# Patient Record
Sex: FEMALE | Race: BLACK OR AFRICAN AMERICAN | NOT HISPANIC OR LATINO | Employment: UNEMPLOYED | URBAN - METROPOLITAN AREA
[De-identification: names, ages, dates, MRNs, and addresses within clinical notes are randomized per-mention and may not be internally consistent; named-entity substitution may affect disease eponyms.]

---

## 2024-11-27 ENCOUNTER — APPOINTMENT (OUTPATIENT)
Dept: LAB | Facility: MEDICAL CENTER | Age: 34
End: 2024-11-27
Attending: FAMILY MEDICINE
Payer: COMMERCIAL

## 2024-11-27 ENCOUNTER — OFFICE VISIT (OUTPATIENT)
Dept: MEDICAL GROUP | Facility: PHYSICIAN GROUP | Age: 34
End: 2024-11-27
Payer: COMMERCIAL

## 2024-11-27 VITALS
DIASTOLIC BLOOD PRESSURE: 60 MMHG | HEIGHT: 69 IN | BODY MASS INDEX: 20.44 KG/M2 | TEMPERATURE: 98.1 F | OXYGEN SATURATION: 97 % | WEIGHT: 138 LBS | SYSTOLIC BLOOD PRESSURE: 112 MMHG | HEART RATE: 98 BPM

## 2024-11-27 DIAGNOSIS — G89.29 CHRONIC RUQ PAIN: ICD-10-CM

## 2024-11-27 DIAGNOSIS — Z23 NEED FOR VACCINATION: ICD-10-CM

## 2024-11-27 DIAGNOSIS — R10.11 CHRONIC RUQ PAIN: ICD-10-CM

## 2024-11-27 DIAGNOSIS — D25.9 UTERINE LEIOMYOMA, UNSPECIFIED LOCATION: ICD-10-CM

## 2024-11-27 DIAGNOSIS — Z00.00 WELLNESS EXAMINATION: ICD-10-CM

## 2024-11-27 DIAGNOSIS — Z22.7 TB LUNG, LATENT: ICD-10-CM

## 2024-11-27 RX ORDER — ISONIAZID 300 MG/1
TABLET ORAL
Qty: 36 TABLET | Refills: 0 | Status: SHIPPED | OUTPATIENT
Start: 2024-11-27

## 2024-11-27 ASSESSMENT — PATIENT HEALTH QUESTIONNAIRE - PHQ9: CLINICAL INTERPRETATION OF PHQ2 SCORE: 0

## 2024-11-27 NOTE — PROGRESS NOTES
"Verbal consent was acquired by the patient to use [employer] ambient listening note generation during this visit     Subjective:     HPI:   History of Present Illness  The patient presents for evaluation of multiple medical concerns and to establish care. She is accompanied by an adult male.    Abdominal Pain  She reports experiencing sharp pain in her right upper abdomen, particularly when standing or applying pressure to the area. This discomfort has been present for approximately 5 months. She has a history of two C-sections, during which a fibroid was discovered. The fibroid has since reduced in size. She first noticed the sharp pain during a checkup in [place]. She is considering having a third child and is concerned about the possibility of another . She reports no nausea, vomiting, breathing difficulties, back pain, or rashes. An x-ray conducted in [place] showed no abnormalities.  - Onset: First noticed during a checkup in [place].  - Location: Abdomen.  - Duration: Approximately 5 months.  - Character: Sharp pain, particularly when standing or applying pressure.  - Severity: Concerned about the possibility of another .      Latent TB  She has a past medical history of latent tuberculosis identified on her immigration testing. During her immigration process, she was informed that she had latent tuberculosis and would require treatment. She reports no current symptoms such as cough or weight loss. She has not undergone any kidney or liver tests. She has not received her influenza vaccine this season.        Objective:     Exam:  /60 (BP Location: Left arm, Patient Position: Sitting, BP Cuff Size: Adult)   Pulse 98   Temp 36.7 °C (98.1 °F) (Temporal)   Ht 1.753 m (5' 9\")   Wt 62.6 kg (138 lb)   SpO2 97%   BMI 20.38 kg/m²  Body mass index is 20.38 kg/m².    Physical Exam  Constitutional:       Appearance: Normal appearance.   HENT:      Head: Normocephalic and atraumatic.      " Right Ear: Tympanic membrane, ear canal and external ear normal.      Left Ear: Tympanic membrane, ear canal and external ear normal.      Nose: Nose normal.      Mouth/Throat:      Mouth: Mucous membranes are moist.   Eyes:      Extraocular Movements: Extraocular movements intact.      Conjunctiva/sclera: Conjunctivae normal.      Pupils: Pupils are equal, round, and reactive to light.   Cardiovascular:      Rate and Rhythm: Normal rate and regular rhythm.      Heart sounds: No murmur heard.  Pulmonary:      Effort: Pulmonary effort is normal.      Breath sounds: Normal breath sounds.   Abdominal:      General: Abdomen is flat. Bowel sounds are normal. There is no distension.      Palpations: There is no mass.      Tenderness: There is no abdominal tenderness. There is no guarding or rebound.   Musculoskeletal:      Cervical back: Neck supple.   Skin:     General: Skin is warm and dry.   Neurological:      General: No focal deficit present.      Mental Status: She is alert and oriented to person, place, and time.   Psychiatric:         Mood and Affect: Mood normal.         Assessment & Plan:     1. TB lung, latent  Comp Metabolic Panel    Referral to Infectious Disease    isoniazid (NYDRAZID) 300 MG Tab    Rifapentine 150 MG Tab      2. Wellness examination  INSULIN FASTING    HEMOGLOBIN A1C    TSH+FREE T4    Lipid Profile    Comp Metabolic Panel    CBC WITH DIFFERENTIAL    INFLUENZA VACCINE TRI INJ (PF)      3. Need for vaccination  INFLUENZA VACCINE TRI INJ (PF)      4. Uterine leiomyoma, unspecified location  Referral to OB/Gyn    CT-ABDOMEN-PELVIS WITH      5. Chronic RUQ pain  CT-ABDOMEN-PELVIS WITH          Assessment & Plan  1. Abdominal pain  - Pain appears to be muscular in nature, possibly due to muscle spasms or irritation  - Pain is sharp and occurs when touched  - No associated nausea, vomiting, breathing difficulties, back pain, or rashes  - CT scan of the abdomen will be ordered to further  investigate    2. History of C-sections/Uterine fibroid  - Discussed potential risks associated with a third , such as scarring and rupture  - Referral to an OB/GYN to ensure it is safe to carry a third child and manage any complications    3. Latent tuberculosis  - Reviewed email sent by the immigration department    --Patient will forward the information to have in her chart  - Treatment for latent tuberculosis will be initiated  - Referral to an infectious disease specialist for comprehensive care  - Advised to upload previous lab work and chest x-ray results to [employer]  - Additional lab work will be ordered to monitor health before starting treatment    4. Health Maintenance  - Influenza vaccine recommended to prevent severe illness during flu season  - Will order routine lab work as well          Return in about 6 weeks (around 2025) for Med F/U, Lab F/U.    Please note that this dictation was created using voice recognition software. I have made every reasonable attempt to correct obvious errors, but I expect that there are errors of grammar and possibly content that I did not discover before finalizing the note.        I personally reviewed and updated and reviewed the patient's personal, social, family and surgical history at today's appointment.

## 2024-12-05 ENCOUNTER — TELEPHONE (OUTPATIENT)
Dept: MEDICAL GROUP | Facility: PHYSICIAN GROUP | Age: 34
End: 2024-12-05
Payer: COMMERCIAL

## 2024-12-05 NOTE — TELEPHONE ENCOUNTER
Can we call the patient and have her bring in her TB test results and/or xray for the ID referral?

## 2024-12-13 DIAGNOSIS — Z22.7 TB LUNG, LATENT: ICD-10-CM

## 2024-12-23 ENCOUNTER — APPOINTMENT (OUTPATIENT)
Dept: RADIOLOGY | Facility: MEDICAL CENTER | Age: 34
End: 2024-12-23
Attending: FAMILY MEDICINE
Payer: COMMERCIAL

## 2024-12-26 ENCOUNTER — HOSPITAL ENCOUNTER (OUTPATIENT)
Dept: RADIOLOGY | Facility: MEDICAL CENTER | Age: 34
End: 2024-12-26
Attending: FAMILY MEDICINE
Payer: COMMERCIAL

## 2024-12-26 DIAGNOSIS — R10.11 CHRONIC RUQ PAIN: ICD-10-CM

## 2024-12-26 DIAGNOSIS — Z22.7 TB LUNG, LATENT: ICD-10-CM

## 2024-12-26 DIAGNOSIS — G89.29 CHRONIC RUQ PAIN: ICD-10-CM

## 2024-12-26 DIAGNOSIS — D25.9 UTERINE LEIOMYOMA, UNSPECIFIED LOCATION: ICD-10-CM

## 2024-12-26 PROCEDURE — 74177 CT ABD & PELVIS W/CONTRAST: CPT

## 2024-12-26 PROCEDURE — 700117 HCHG RX CONTRAST REV CODE 255: Performed by: FAMILY MEDICINE

## 2024-12-26 PROCEDURE — 71046 X-RAY EXAM CHEST 2 VIEWS: CPT

## 2024-12-26 RX ADMIN — IOHEXOL 25 ML: 240 INJECTION, SOLUTION INTRATHECAL; INTRAVASCULAR; INTRAVENOUS; ORAL at 15:00

## 2024-12-26 RX ADMIN — IOHEXOL 100 ML: 350 INJECTION, SOLUTION INTRAVENOUS at 16:35

## 2024-12-30 ENCOUNTER — HOSPITAL ENCOUNTER (OUTPATIENT)
Dept: LAB | Facility: MEDICAL CENTER | Age: 34
End: 2024-12-30
Attending: FAMILY MEDICINE
Payer: COMMERCIAL

## 2024-12-30 DIAGNOSIS — Z00.00 WELLNESS EXAMINATION: ICD-10-CM

## 2024-12-30 DIAGNOSIS — Z22.7 TB LUNG, LATENT: ICD-10-CM

## 2024-12-30 LAB
ALBUMIN SERPL BCP-MCNC: 4.3 G/DL (ref 3.2–4.9)
ALBUMIN/GLOB SERPL: 1.2 G/DL
ALP SERPL-CCNC: 54 U/L (ref 30–99)
ALT SERPL-CCNC: 15 U/L (ref 2–50)
ANION GAP SERPL CALC-SCNC: 15 MMOL/L (ref 7–16)
AST SERPL-CCNC: 18 U/L (ref 12–45)
BASOPHILS # BLD AUTO: 0.6 % (ref 0–1.8)
BASOPHILS # BLD: 0.04 K/UL (ref 0–0.12)
BILIRUB SERPL-MCNC: 0.3 MG/DL (ref 0.1–1.5)
BUN SERPL-MCNC: 9 MG/DL (ref 8–22)
CALCIUM ALBUM COR SERPL-MCNC: 8.8 MG/DL (ref 8.5–10.5)
CALCIUM SERPL-MCNC: 9 MG/DL (ref 8.5–10.5)
CHLORIDE SERPL-SCNC: 103 MMOL/L (ref 96–112)
CHOLEST SERPL-MCNC: 161 MG/DL (ref 100–199)
CO2 SERPL-SCNC: 21 MMOL/L (ref 20–33)
CREAT SERPL-MCNC: 0.49 MG/DL (ref 0.5–1.4)
EOSINOPHIL # BLD AUTO: 0.1 K/UL (ref 0–0.51)
EOSINOPHIL NFR BLD: 1.5 % (ref 0–6.9)
ERYTHROCYTE [DISTWIDTH] IN BLOOD BY AUTOMATED COUNT: 41.7 FL (ref 35.9–50)
EST. AVERAGE GLUCOSE BLD GHB EST-MCNC: 103 MG/DL
GFR SERPLBLD CREATININE-BSD FMLA CKD-EPI: 126 ML/MIN/1.73 M 2
GLOBULIN SER CALC-MCNC: 3.6 G/DL (ref 1.9–3.5)
GLUCOSE SERPL-MCNC: 87 MG/DL (ref 65–99)
HBA1C MFR BLD: 5.2 % (ref 4–5.6)
HCT VFR BLD AUTO: 40.2 % (ref 37–47)
HDLC SERPL-MCNC: 53 MG/DL
HGB BLD-MCNC: 12.7 G/DL (ref 12–16)
IMM GRANULOCYTES # BLD AUTO: 0.01 K/UL (ref 0–0.11)
IMM GRANULOCYTES NFR BLD AUTO: 0.1 % (ref 0–0.9)
LDLC SERPL CALC-MCNC: 93 MG/DL
LYMPHOCYTES # BLD AUTO: 2.82 K/UL (ref 1–4.8)
LYMPHOCYTES NFR BLD: 41 % (ref 22–41)
MCH RBC QN AUTO: 27.5 PG (ref 27–33)
MCHC RBC AUTO-ENTMCNC: 31.6 G/DL (ref 32.2–35.5)
MCV RBC AUTO: 87.2 FL (ref 81.4–97.8)
MONOCYTES # BLD AUTO: 0.5 K/UL (ref 0–0.85)
MONOCYTES NFR BLD AUTO: 7.3 % (ref 0–13.4)
NEUTROPHILS # BLD AUTO: 3.4 K/UL (ref 1.82–7.42)
NEUTROPHILS NFR BLD: 49.5 % (ref 44–72)
NRBC # BLD AUTO: 0 K/UL
NRBC BLD-RTO: 0 /100 WBC (ref 0–0.2)
PLATELET # BLD AUTO: 274 K/UL (ref 164–446)
PMV BLD AUTO: 10.8 FL (ref 9–12.9)
POTASSIUM SERPL-SCNC: 3.9 MMOL/L (ref 3.6–5.5)
PROT SERPL-MCNC: 7.9 G/DL (ref 6–8.2)
RBC # BLD AUTO: 4.61 M/UL (ref 4.2–5.4)
SODIUM SERPL-SCNC: 139 MMOL/L (ref 135–145)
T4 FREE SERPL-MCNC: 1.09 NG/DL (ref 0.93–1.7)
TRIGL SERPL-MCNC: 73 MG/DL (ref 0–149)
TSH SERPL-ACNC: 0.93 UIU/ML (ref 0.35–5.5)
WBC # BLD AUTO: 6.9 K/UL (ref 4.8–10.8)

## 2024-12-30 PROCEDURE — 80053 COMPREHEN METABOLIC PANEL: CPT

## 2024-12-30 PROCEDURE — 83525 ASSAY OF INSULIN: CPT

## 2024-12-30 PROCEDURE — 84443 ASSAY THYROID STIM HORMONE: CPT

## 2024-12-30 PROCEDURE — 36415 COLL VENOUS BLD VENIPUNCTURE: CPT

## 2024-12-30 PROCEDURE — 84439 ASSAY OF FREE THYROXINE: CPT

## 2024-12-30 PROCEDURE — 83036 HEMOGLOBIN GLYCOSYLATED A1C: CPT

## 2024-12-30 PROCEDURE — 85025 COMPLETE CBC W/AUTO DIFF WBC: CPT

## 2024-12-30 PROCEDURE — 80061 LIPID PANEL: CPT

## 2025-01-01 LAB — INSULIN P FAST SERPL-ACNC: 14 UIU/ML (ref 3–25)

## 2025-01-15 ENCOUNTER — HOSPITAL ENCOUNTER (OUTPATIENT)
Dept: LAB | Facility: MEDICAL CENTER | Age: 35
End: 2025-01-15
Attending: FAMILY MEDICINE
Payer: COMMERCIAL

## 2025-01-15 DIAGNOSIS — Z22.7 TB LUNG, LATENT: ICD-10-CM

## 2025-01-15 LAB
T4 FREE SERPL-MCNC: 1.24 NG/DL (ref 0.93–1.7)
TSH SERPL-ACNC: 1.4 UIU/ML (ref 0.35–5.5)

## 2025-01-15 PROCEDURE — 36415 COLL VENOUS BLD VENIPUNCTURE: CPT

## 2025-01-15 PROCEDURE — 84443 ASSAY THYROID STIM HORMONE: CPT

## 2025-01-15 PROCEDURE — 84439 ASSAY OF FREE THYROXINE: CPT

## 2025-01-15 PROCEDURE — 86480 TB TEST CELL IMMUN MEASURE: CPT

## 2025-01-20 ENCOUNTER — OFFICE VISIT (OUTPATIENT)
Dept: MEDICAL GROUP | Facility: PHYSICIAN GROUP | Age: 35
End: 2025-01-20
Payer: COMMERCIAL

## 2025-01-20 VITALS
HEIGHT: 69 IN | WEIGHT: 142 LBS | SYSTOLIC BLOOD PRESSURE: 118 MMHG | BODY MASS INDEX: 21.03 KG/M2 | HEART RATE: 92 BPM | TEMPERATURE: 98.2 F | DIASTOLIC BLOOD PRESSURE: 60 MMHG | OXYGEN SATURATION: 98 %

## 2025-01-20 DIAGNOSIS — K43.9 VENTRAL HERNIA WITHOUT OBSTRUCTION OR GANGRENE: ICD-10-CM

## 2025-01-20 PROCEDURE — 99214 OFFICE O/P EST MOD 30 MIN: CPT | Performed by: FAMILY MEDICINE

## 2025-01-20 PROCEDURE — 3074F SYST BP LT 130 MM HG: CPT | Performed by: FAMILY MEDICINE

## 2025-01-20 PROCEDURE — 3078F DIAST BP <80 MM HG: CPT | Performed by: FAMILY MEDICINE

## 2025-01-20 ASSESSMENT — FIBROSIS 4 INDEX: FIB4 SCORE: 0.59

## 2025-01-20 ASSESSMENT — PATIENT HEALTH QUESTIONNAIRE - PHQ9: CLINICAL INTERPRETATION OF PHQ2 SCORE: 0

## 2025-01-20 NOTE — PROGRESS NOTES
"Verbal consent was acquired by the patient to use [employer] ambient listening note generation during this visit     Subjective:     HPI:   History of Present Illness  The patient presents for evaluation of fibroids, hernia, and latent tuberculosis.    Fibroids  She has been diagnosed with uterine fibroids and is seeking information regarding their size and potential impact on her health. She is also interested in understanding the implications of these conditions on her ability to conceive naturally or through in vitro fertilization (IVF). Additionally, she is curious about the necessity of wearing a belt during exercise due to her fibroids and hernia. She reports no current bleeding issues.  - Diagnosis: Fibroids.  - Concerns: Size, impact on health, implications for natural conception or IVF, necessity of wearing a belt during exercise.  - Symptoms: No current bleeding issues.    Hernia  She has a hernia and is experiencing abdominal bulging, which she attributes to the hernia. She is concerned about the possibility of the hernia worsening with future pregnancies. She is also interested in understanding the potential long-term consequences if the hernia is left untreated.  - Diagnosis: Hernia.  - Symptoms: Abdominal bulging.  - Concerns: Worsening with future pregnancies, potential long-term consequences if untreated.    Latent Tuberculosis  She has latent tuberculosis. Patient notes that she has done her lab work.        Objective:     Exam:  /60 (BP Location: Left arm, Patient Position: Sitting, BP Cuff Size: Adult)   Pulse 92   Temp 36.8 °C (98.2 °F) (Temporal)   Ht 1.753 m (5' 9\")   Wt 64.4 kg (142 lb)   SpO2 98%   BMI 20.97 kg/m²  Body mass index is 20.97 kg/m².    Physical Exam  Constitutional:       Appearance: Normal appearance.   HENT:      Head: Normocephalic and atraumatic.      Nose: Nose normal.      Mouth/Throat:      Mouth: Mucous membranes are moist.   Eyes:      Extraocular " Movements: Extraocular movements intact.      Conjunctiva/sclera: Conjunctivae normal.      Pupils: Pupils are equal, round, and reactive to light.   Cardiovascular:      Rate and Rhythm: Normal rate and regular rhythm.      Heart sounds: No murmur heard.  Pulmonary:      Effort: Pulmonary effort is normal.      Breath sounds: Normal breath sounds.   Abdominal:      General: Bowel sounds are normal.      Palpations: Abdomen is soft.   Musculoskeletal:      Cervical back: Neck supple.   Skin:     General: Skin is warm and dry.   Neurological:      General: No focal deficit present.      Mental Status: She is alert and oriented to person, place, and time.   Psychiatric:         Mood and Affect: Mood normal.             Results  Lab Results   Component Value Date/Time    WBC 6.9 12/30/2024 09:07 AM    RBC 4.61 12/30/2024 09:07 AM    HEMOGLOBIN 12.7 12/30/2024 09:07 AM    HEMATOCRIT 40.2 12/30/2024 09:07 AM    MCV 87.2 12/30/2024 09:07 AM    MCH 27.5 12/30/2024 09:07 AM    MCHC 31.6 (L) 12/30/2024 09:07 AM    MPV 10.8 12/30/2024 09:07 AM    NEUTSPOLYS 49.50 12/30/2024 09:07 AM    LYMPHOCYTES 41.00 12/30/2024 09:07 AM    MONOCYTES 7.30 12/30/2024 09:07 AM    EOSINOPHILS 1.50 12/30/2024 09:07 AM    BASOPHILS 0.60 12/30/2024 09:07 AM       Lab Results   Component Value Date/Time    CHOLSTRLTOT 161 12/30/2024 09:07 AM    LDL 93 12/30/2024 09:07 AM    HDL 53 12/30/2024 09:07 AM    TRIGLYCERIDE 73 12/30/2024 09:07 AM       Lab Results   Component Value Date/Time    SODIUM 139 12/30/2024 09:07 AM    POTASSIUM 3.9 12/30/2024 09:07 AM    CHLORIDE 103 12/30/2024 09:07 AM    CO2 21 12/30/2024 09:07 AM    GLUCOSE 87 12/30/2024 09:07 AM    BUN 9 12/30/2024 09:07 AM    CREATININE 0.49 (L) 12/30/2024 09:07 AM     Lab Results   Component Value Date/Time    ALKPHOSPHAT 54 12/30/2024 09:07 AM    ASTSGOT 18 12/30/2024 09:07 AM    ALTSGPT 15 12/30/2024 09:07 AM    TBILIRUBIN 0.3 12/30/2024 09:07 AM       Imaging  Chest x-ray showed no  abnormalities. CT scan revealed a large ventral hernia and fibroids measuring 10.5 x 5.5 x 5.5 cm.    Assessment & Plan:     1. Ventral hernia without obstruction or gangrene  Referral to General Surgery          Assessment & Plan  1. Fibroids:  - Significant fibroid measuring 10.5 x 5.5 x 5.5 cm  - Benign but can cause issues such as worsening menstrual bleeding and complications with future pregnancies  - No immediate emergency concern with the fibroid size based on CT report  - Informed that fibroids could potentially grow back after removal  - Advised to continue exercising and consider using a belt during workouts to manage abdominal pressure  - Recommended strengthening core muscles to help manage the condition  - Referral to an OB-GYN for further evaluation and management    2. Hernia:  - Hernia with a wide neck, less likely to cause complications but can be annoying due to its size  - Responsible for the bulge in her abdomen  - Advised that surgery is the only definitive solution but generally recommended to leave it alone unless it causes significant pain or complications  - Informed about potential risks and benefits of hernia surgery, including the use of mesh and possible postoperative discomfort  - Advised to continue exercising and consider using a belt during workouts to manage abdominal pressure  - Recommended strengthening core muscles to help manage the condition  - Referral to a general surgeon for further evaluation and management    3. Latent Tuberculosis:  - History of latent TB  - Will continue current antibiotic regimen  - Referral to an infectious disease specialist once TB test results are available for further management    Follow-up:  - Patient to follow up in 1 year          Return in about 1 year (around 1/20/2026) for Annual.    Please note that this dictation was created using voice recognition software. I have made every reasonable attempt to correct obvious errors, but I expect that  there are errors of grammar and possibly content that I did not discover before finalizing the note.        I personally reviewed and updated and reviewed the patient's personal, social, family and surgical history at today's appointment.

## 2025-01-21 LAB
GAMMA INTERFERON BACKGROUND BLD IA-ACNC: 2.75 IU/ML
M TB IFN-G BLD-IMP: POSITIVE
M TB IFN-G CD4+ BCKGRND COR BLD-ACNC: >10 IU/ML
MITOGEN IGNF BCKGRD COR BLD-ACNC: >10 IU/ML
QFT TB2 - NIL TBQ2: >10 IU/ML

## 2025-01-22 DIAGNOSIS — Z22.7 TB LUNG, LATENT: ICD-10-CM

## 2025-02-24 ENCOUNTER — TELEPHONE (OUTPATIENT)
Dept: INFECTIOUS DISEASES | Facility: MEDICAL CENTER | Age: 35
End: 2025-02-24
Payer: COMMERCIAL

## 2025-03-11 NOTE — PROGRESS NOTES
Minimally Invasive Gynecologic Surgery Consult       CC: fibroids      HPI  Luke Moon is a 34 y.o. female No obstetric history on file. presenting today for the above.    Patient with fibroid uterus and the following symptoms:  - Heavy menstrual bleeding: ***  - Dysmenorrhea: ***  - Bulk symptoms: ***  - Dyspareunia: ***  - Bladder symptoms: ***  - Bowel symptoms: ***  - Infertility: ***    Previous treatment:  - Medical: ***  - Surgical: ***    Desires future fertility: ***        Imaging  CT A/P with fibroid uteurs    Menstrual History  No LMP recorded.  Regular, monthly, normal flow ***    Gynecologic History  Last pap: *** NILM/HPV neg  Hx abnormal pap smears: ***  Hx of PID/STDs: ***  Contraception plan: ***      OB History   No obstetric history on file.       Past Medical History  Past Medical History:   Diagnosis Date    Bartholin cyst        Past Surgical History  Past Surgical History:   Procedure Laterality Date    CYST EXCISION Left     PRIMARY C SECTION N/A     x2       Social History  Social History     Tobacco Use    Smoking status: Never    Smokeless tobacco: Never   Vaping Use    Vaping status: Never Used   Substance Use Topics    Alcohol use: Not Currently    Drug use: Not Currently        Family History  Family History   Problem Relation Age of Onset    Other Problem (IVP) Mother     Other Problem (Other Problem) Father        Home Medications  Current Outpatient Medications   Medication Sig    isoniazid (NYDRAZID) 300 MG Tab Take 3 caps every 7 days for 84 days    Rifapentine 150 MG Tab Take 6 Tablets by mouth every 7 days for 84 days.       Allergies/Reactions  No Known Allergies       ROS: I have reviewed all systems with patient. Pertinent positive and negative findings are listed below except for what is otherwise stated in the History of Present Illness.       Objective:    Labs  Lab Results   Component Value Date/Time    WBC 6.9 12/30/2024 09:07 AM    RBC 4.61 12/30/2024 09:07 AM     HEMOGLOBIN 12.7 12/30/2024 09:07 AM    HEMATOCRIT 40.2 12/30/2024 09:07 AM    MCV 87.2 12/30/2024 09:07 AM    MCH 27.5 12/30/2024 09:07 AM    MCHC 31.6 (L) 12/30/2024 09:07 AM    RDW 41.7 12/30/2024 09:07 AM    PLATELETCT 274 12/30/2024 09:07 AM    MPV 10.8 12/30/2024 09:07 AM    NEUTSPOLYS 49.50 12/30/2024 09:07 AM    LYMPHOCYTES 41.00 12/30/2024 09:07 AM    MONOCYTES 7.30 12/30/2024 09:07 AM    EOSINOPHILS 1.50 12/30/2024 09:07 AM    BASOPHILS 0.60 12/30/2024 09:07 AM    IMMGRAN 0.10 12/30/2024 09:07 AM    NRBC 0.00 12/30/2024 09:07 AM    NEUTS 3.40 12/30/2024 09:07 AM    LYMPHS 2.82 12/30/2024 09:07 AM    MONOS 0.50 12/30/2024 09:07 AM    EOS 0.10 12/30/2024 09:07 AM    BASO 0.04 12/30/2024 09:07 AM    IMMGRANAB 0.01 12/30/2024 09:07 AM    NRBCAB 0.00 12/30/2024 09:07 AM       Lab Results   Component Value Date/Time    HBA1C 5.2 12/30/2024 09:07 AM         Physical Exam  There were no vitals taken for this visit.   No LMP recorded.  There is no height or weight on file to calculate BMI.    General: alert, well appearing, and in no distress  Neurological: alert, oriented, normal speech, no focal findings or movement disorder noted  Respiratory: no tachypnea, retractions or cyanosis  Abdominal: soft, nontender***, nondistended, no masses or organomegaly, no scars ***    Pelvic exam:   external genitalia: normal appearance  urinary system: urethral meatus normal  vaginal: normal mucosa without prolapse or lesions  pelvic floor: nontender / tender ***  cervix: normal appearance  adnexa: normal on bimanual exam  uterus: normal size ***, normal contour ***, mobile, non-tender    Female chaperone present - see MA note         Assessment:  ***         Plan:  There are no diagnoses linked to this encounter.       Lindsay Fall MD

## 2025-03-12 ENCOUNTER — APPOINTMENT (OUTPATIENT)
Dept: GYNECOLOGY | Facility: CLINIC | Age: 35
End: 2025-03-12
Payer: COMMERCIAL

## 2025-03-21 ENCOUNTER — APPOINTMENT (OUTPATIENT)
Dept: OBGYN | Facility: CLINIC | Age: 35
End: 2025-03-21
Payer: COMMERCIAL

## 2025-03-28 ENCOUNTER — APPOINTMENT (OUTPATIENT)
Dept: GYNECOLOGY | Facility: CLINIC | Age: 35
End: 2025-03-28
Payer: COMMERCIAL

## 2025-04-16 ENCOUNTER — HOSPITAL ENCOUNTER (OUTPATIENT)
Facility: MEDICAL CENTER | Age: 35
End: 2025-04-16
Attending: STUDENT IN AN ORGANIZED HEALTH CARE EDUCATION/TRAINING PROGRAM
Payer: COMMERCIAL

## 2025-04-16 ENCOUNTER — APPOINTMENT (OUTPATIENT)
Dept: GYNECOLOGY | Facility: CLINIC | Age: 35
End: 2025-04-16
Payer: COMMERCIAL

## 2025-04-16 VITALS
DIASTOLIC BLOOD PRESSURE: 78 MMHG | SYSTOLIC BLOOD PRESSURE: 132 MMHG | WEIGHT: 145 LBS | BODY MASS INDEX: 21.48 KG/M2 | HEIGHT: 69 IN | HEART RATE: 98 BPM

## 2025-04-16 DIAGNOSIS — K43.9 EPIGASTRIC HERNIA: ICD-10-CM

## 2025-04-16 DIAGNOSIS — D21.9 FIBROIDS: ICD-10-CM

## 2025-04-16 DIAGNOSIS — Z12.4 CERVICAL CANCER SCREENING: ICD-10-CM

## 2025-04-16 PROCEDURE — 99204 OFFICE O/P NEW MOD 45 MIN: CPT | Performed by: STUDENT IN AN ORGANIZED HEALTH CARE EDUCATION/TRAINING PROGRAM

## 2025-04-16 PROCEDURE — 87624 HPV HI-RISK TYP POOLED RSLT: CPT

## 2025-04-16 PROCEDURE — 3078F DIAST BP <80 MM HG: CPT | Performed by: STUDENT IN AN ORGANIZED HEALTH CARE EDUCATION/TRAINING PROGRAM

## 2025-04-16 PROCEDURE — 88142 CYTOPATH C/V THIN LAYER: CPT

## 2025-04-16 PROCEDURE — 3075F SYST BP GE 130 - 139MM HG: CPT | Performed by: STUDENT IN AN ORGANIZED HEALTH CARE EDUCATION/TRAINING PROGRAM

## 2025-04-16 ASSESSMENT — FIBROSIS 4 INDEX: FIB4 SCORE: 0.59

## 2025-04-16 NOTE — PROGRESS NOTES
Pt here as a NP Consult   Pt states she is having tenderness to the touch on her R side. Pt was wondering if her uterine fibroids will interfere with having another child. Pt states that her prev children were both C-sections.   Pt denies any vag concerns like abn discharge, pain, discomfort or odor.   LMP: 03.31.25; Lasting 4 days with moderate bleeding.   Last Pap: Per Pt never had one, but is open to having one today.   CT: 12.27.24; Uterine Fibroids.   Cx Hx: No  Hx STD: No  BC: None  Good # [phone]  Pharmacy Verified

## 2025-04-16 NOTE — PROGRESS NOTES
Minimally Invasive Gynecologic Surgery Consult         CC: Fibroid      HPI  Luke Moon is a 34 y.o. female  presenting today for the above.  Patient had CT of abdomen and pelvis performed for abdominal pain and suspected epigastric hernia.  CT scan notable for 6 x 10 cm epigastric hernia plus rectus diastases containing colon.  Also notable for 5 cm posterior fibroid.  Patient had known about her fibroids.  However has been largely asymptomatic.  She presents today to discuss fibroid finding as she is planning to conceive for the third time.  Did not have issues conceiving previously.  Uncomplicated pregnancies.  Had an elective  with her first child as she did not want to be induced.  Had a repeat  with her second.  No postop complications.  Has not tried to conceive yet.    Patient with fibroid uterus and the following symptoms:  - Heavy menstrual bleeding: no  - Dysmenorrhea: no  - Bulk symptoms: no  - Dyspareunia: no  - Bladder symptoms: no  - Bowel symptoms: no  - Infertility: no    Previous treatment:  - Medical: none  - Surgical: none    Desires future fertility: yes        Imaging  CT A/P  :   FINDINGS: The visible lung bases do not show a suspicious abnormality. No peritoneal air.     The midline epigastrium just above the umbilicus shows a 6 x 10 cm wide area of bulging between the rectus abdominis muscles (axial image 42 and sagittal image 33). Colon bulges through this wide neck hernia in a rectus diastases pattern. Oral contrast   to the distal small bowel. No distention to suggest obstruction. No diverticulitis. A short segment of the appendix is visible posterior to the cecum in the mid right abdomen (axial image 42, coronal image 23 and 30) appears grossly normal.     The liver does not show any suspicious focal lesion. Nonvisualization of the gallbladder consistent with prior cholecystectomy.     No pancreas mass or inflammation evident. No adrenal mass. The spleen  is normal in size. No adenopathy. No aortic aneurysm. Mesenteric vessels enhance grossly normally.     The kidneys enhance normally. No stone or hydronephrosis. The bladder is empty.     In the pelvis, the uterus is mildly enlarged with fibroids. The uterus measures 10.5 x 5.5 x 5.5 cm not including a 4.5 cm partially calcified posterior right lower uterine segment fibroid. No suggestion of endometrial thickening. Small free pelvic fluid   and the right side of the cul-de-sac (axial image 68). No adnexal mass. No inguinal hernia.     The bones do not show any suspicious lesion.        IMPRESSION:        1. 6 x 10 cm wide neck midline epigastric hernia between the rectus abdominis muscles. Colon bulges into this hernia. No obstruction.     2. Uterine fibroids. Small free pelvic fluid.    Menstrual History  Patient's last menstrual period was 2025 (exact date).  Regular, monthly, normal flow       Gynecologic History  Last pap: never had  Hx of PID/STDs: no  Contraception plan: none      OB History    Para Term  AB Living   2 2 2   2   SAB IAB Ectopic Molar Multiple Live Births        2      # Outcome Date GA Lbr Avelino/2nd Weight Sex Type Anes PTL Lv   2 Term 21    F CS-Unspec   [place]IEGO   1 Term 18    F CS-Unspec   DIEGO       Past Medical History  Past Medical History:   Diagnosis Date    Bartholin cyst        Past Surgical History  Past Surgical History:   Procedure Laterality Date    CYST EXCISION Left     PRIMARY C SECTION N/A     x2       Social History  Social History     Tobacco Use    Smoking status: Never    Smokeless tobacco: Never   Vaping Use    Vaping status: Never Used   Substance Use Topics    Alcohol use: Not Currently    Drug use: Not Currently        Family History  Family History   Problem Relation Age of Onset    Other Problem (IVP) Mother     Other Problem (Other Problem) Father        Home Medications  Current Outpatient Medications   Medication Sig    isoniazid (NYDRAZID)  "300 MG Tab Take 3 caps every 7 days for 84 days (Patient not taking: Reported on 4/16/2025)    Rifapentine 150 MG Tab Take 6 Tablets by mouth every 7 days for 84 days. (Patient not taking: Reported on 4/16/2025)       Allergies/Reactions  No Known Allergies       ROS: I have reviewed all systems with patient. Pertinent positive and negative findings are listed below except for what is otherwise stated in the History of Present Illness.       Objective:    Labs  Lab Results   Component Value Date/Time    WBC 6.9 12/30/2024 09:07 AM    RBC 4.61 12/30/2024 09:07 AM    HEMOGLOBIN 12.7 12/30/2024 09:07 AM    HEMATOCRIT 40.2 12/30/2024 09:07 AM    MCV 87.2 12/30/2024 09:07 AM    MCH 27.5 12/30/2024 09:07 AM    MCHC 31.6 (L) 12/30/2024 09:07 AM    RDW 41.7 12/30/2024 09:07 AM    PLATELETCT 274 12/30/2024 09:07 AM    MPV 10.8 12/30/2024 09:07 AM    NEUTSPOLYS 49.50 12/30/2024 09:07 AM    LYMPHOCYTES 41.00 12/30/2024 09:07 AM    MONOCYTES 7.30 12/30/2024 09:07 AM    EOSINOPHILS 1.50 12/30/2024 09:07 AM    BASOPHILS 0.60 12/30/2024 09:07 AM    IMMGRAN 0.10 12/30/2024 09:07 AM    NRBC 0.00 12/30/2024 09:07 AM    NEUTS 3.40 12/30/2024 09:07 AM    LYMPHS 2.82 12/30/2024 09:07 AM    MONOS 0.50 12/30/2024 09:07 AM    EOS 0.10 12/30/2024 09:07 AM    BASO 0.04 12/30/2024 09:07 AM    IMMGRANAB 0.01 12/30/2024 09:07 AM    NRBCAB 0.00 12/30/2024 09:07 AM       Lab Results   Component Value Date/Time    HBA1C 5.2 12/30/2024 09:07 AM         Physical Exam  /78 (BP Location: Right arm, Patient Position: Sitting, BP Cuff Size: Adult)   Pulse 98   Ht 5' 9\"   Wt 145 lb   LMP 03/31/2025 (Exact Date)   BMI 21.41 kg/m²    Patient's last menstrual period was 03/31/2025 (exact date).  Body mass index is 21.41 kg/m².    General: alert, well appearing, and in no distress  Neurological: alert, oriented, normal speech, no focal findings or movement disorder noted  Respiratory: no tachypnea, retractions or cyanosis    Pelvic exam: "   external genitalia: normal appearance  urinary system: urethral meatus normal  vaginal: normal mucosa without prolapse or lesions  pelvic floor: nontender   cervix: normal appearance  adnexa: normal on bimanual exam  uterus: normal size , normal contour , mobile, non-tender    Female chaperone present - see MA note         Assessment:  34-year-old   Fibroid uterus, asymptomatic  Epigastric hernia  Desires to conceive again  No infertility issues     Plan:  1. Fibroids  We reviewed the diagnosis of uterine fibroids, which are benign tumors of the uterus. Imaging was reviewed with the patient and illustrations of normal pelvic anatomy and uterine fibroids were shown. It was explained that ovarian hormones are thought to enhance the growth and survival of fibroids, that they have very low malignant potential, and that symptoms vary widely between patients. Depending on the size, number, and location of the fibroids, symptoms can include heavy bleeding, intermenstrual bleeding, pelvic pressure, constipation, urinary disturbances, and pelvic pain. The patient was also counselled that fibroid symptoms tend to recede after menopause with the decrease in ovarian hormones.     We also reviewed her future fertility plans; she desires to maintain her fertility. We reviewed that fertility and successful pregnancy is based on multiple factors, including age, medical history, and partner fertility. Submucosal fibroids can decrease pregnancy rates and increase miscarriage rates, and removal of these fibroids can improve the chance of successful pregnancy. Large intramural fibroids can also affect fertility, but it is unclear if removal of this type of fibroids improve pregnancy rates. Subserosal fibroids do not have an effect on fertility.      Treatment options were reviewed, including expectant management, medical management  and surgical management ( myomectomy).  As patient is largely asymptomatic and has not had any  issues with infertility in the past, I do not recommend surgical intervention currently.  I recommend that she attempts to conceive for 6 months, if unable to conceive, plan for pelvic MRI to further delineate uterine fibroids.      2. Cervical cancer screening  - THINPREP PAP WITH HPV; Future    3. Epigastric hernia  - Referral to Colorectal Surgery         Lindsay Fall MD

## 2025-04-21 ENCOUNTER — APPOINTMENT (OUTPATIENT)
Dept: GYNECOLOGY | Facility: CLINIC | Age: 35
End: 2025-04-21
Payer: COMMERCIAL

## 2025-04-23 LAB
HPV I/H RISK 1 DNA SPEC QL NAA+PROBE: NOT DETECTED
SPECIMEN SOURCE: NORMAL
THINPREP PAP, CYTOLOGY NL11781: NORMAL

## 2025-04-24 ENCOUNTER — RESULTS FOLLOW-UP (OUTPATIENT)
Dept: GYNECOLOGY | Facility: CLINIC | Age: 35
End: 2025-04-24
Payer: COMMERCIAL

## 2025-06-11 ENCOUNTER — OFFICE VISIT (OUTPATIENT)
Dept: MEDICAL GROUP | Facility: PHYSICIAN GROUP | Age: 35
End: 2025-06-11
Payer: COMMERCIAL

## 2025-06-11 ENCOUNTER — HOSPITAL ENCOUNTER (OUTPATIENT)
Dept: LAB | Facility: MEDICAL CENTER | Age: 35
End: 2025-06-11
Attending: FAMILY MEDICINE
Payer: COMMERCIAL

## 2025-06-11 VITALS
HEIGHT: 69 IN | WEIGHT: 147 LBS | DIASTOLIC BLOOD PRESSURE: 70 MMHG | TEMPERATURE: 98.6 F | SYSTOLIC BLOOD PRESSURE: 100 MMHG | HEART RATE: 85 BPM | BODY MASS INDEX: 21.77 KG/M2 | OXYGEN SATURATION: 100 %

## 2025-06-11 DIAGNOSIS — Z01.84 IMMUNITY STATUS TESTING: ICD-10-CM

## 2025-06-11 DIAGNOSIS — Z11.3 SCREENING EXAMINATION FOR STD (SEXUALLY TRANSMITTED DISEASE): ICD-10-CM

## 2025-06-11 DIAGNOSIS — Z02.89 ENCOUNTER FOR COMPLETION OF FORM WITH PATIENT: ICD-10-CM

## 2025-06-11 DIAGNOSIS — Z11.3 SCREENING EXAMINATION FOR STD (SEXUALLY TRANSMITTED DISEASE): Primary | ICD-10-CM

## 2025-06-11 DIAGNOSIS — Z22.7 TB LUNG, LATENT: ICD-10-CM

## 2025-06-11 PROCEDURE — 86787 VARICELLA-ZOSTER ANTIBODY: CPT

## 2025-06-11 PROCEDURE — 87389 HIV-1 AG W/HIV-1&-2 AB AG IA: CPT

## 2025-06-11 PROCEDURE — 99215 OFFICE O/P EST HI 40 MIN: CPT | Performed by: FAMILY MEDICINE

## 2025-06-11 PROCEDURE — 86735 MUMPS ANTIBODY: CPT

## 2025-06-11 PROCEDURE — 86704 HEP B CORE ANTIBODY TOTAL: CPT

## 2025-06-11 PROCEDURE — 86765 RUBEOLA ANTIBODY: CPT

## 2025-06-11 PROCEDURE — 3078F DIAST BP <80 MM HG: CPT | Performed by: FAMILY MEDICINE

## 2025-06-11 PROCEDURE — 86706 HEP B SURFACE ANTIBODY: CPT

## 2025-06-11 PROCEDURE — 86803 HEPATITIS C AB TEST: CPT

## 2025-06-11 PROCEDURE — 86762 RUBELLA ANTIBODY: CPT

## 2025-06-11 PROCEDURE — 36415 COLL VENOUS BLD VENIPUNCTURE: CPT

## 2025-06-11 PROCEDURE — 86780 TREPONEMA PALLIDUM: CPT

## 2025-06-11 PROCEDURE — 3074F SYST BP LT 130 MM HG: CPT | Performed by: FAMILY MEDICINE

## 2025-06-11 PROCEDURE — 87340 HEPATITIS B SURFACE AG IA: CPT

## 2025-06-11 ASSESSMENT — FIBROSIS 4 INDEX: FIB4 SCORE: 0.59

## 2025-06-11 NOTE — LETTER
June 16, 2025      To whom it may concern,    Luke Moon is up to date on all of her vaccines and or is showing immunity via blood to those diseases you have requested proof off. She is next due for her Tdap in 2031 and her Flu/COVID vaccines in the fall of 2025.    She was treated for latent TB in November 2024 and has completed the course of antibiotics.     Immunization History   Administered Date(s) Administered    Hepatitis B Vaccine (Adol/Adult) 04/15/2024    Influenza split virus trivalent (PF) 11/27/2024    Jenny SARS-CoV-2 Vaccine 04/23/2023, 12/01/2024    MODERNA BIVALENT BOOSTER SARS-COV-2 VACCINE (6+) 07/13/2023    TD Vaccine 11/15/2021, 11/15/2021                                    Shayna Dent M.D.

## 2025-06-12 LAB
HBV CORE AB SERPL QL IA: NONREACTIVE
HBV SURFACE AB SERPL IA-ACNC: 11.6 MIU/ML (ref 0–10)
HBV SURFACE AG SER QL: NORMAL
HCV AB SER QL: NORMAL
HIV 1+2 AB+HIV1 P24 AG SERPL QL IA: NORMAL
RUBV AB SER QL: 33.8 IU/ML
T PALLIDUM AB SER QL IA: NORMAL

## 2025-06-13 LAB
MEV IGG SER-ACNC: 28.1 AU/ML
MUV IGG SER IA-ACNC: 219 AU/ML
VZV IGG SER IA-ACNC: 21.5 S/CO

## 2025-06-17 ENCOUNTER — RESULTS FOLLOW-UP (OUTPATIENT)
Dept: MEDICAL GROUP | Facility: PHYSICIAN GROUP | Age: 35
End: 2025-06-17

## 2025-06-17 NOTE — PROGRESS NOTES
"  Verbal consent was acquired by the patient to use [employer] ambient listening note generation during this visit     Subjective:     HPI:   History of Present Illness  The patient presents for immunization records.      Vaccination History  - She notes that she needs documentation from her current PCP regarding her vaccine status    - Has enrolled in CNA classes  - She recalls receiving one dose of the COVID-19 Moderna vaccine prior to her departure for the [place] but did not complete the series.    - Has had several other versions of the COVID vaccine  - She received the first dose of the hepatitis B vaccine on the day of her departure but has not received any subsequent doses.    Supplemental information: She has a history of chickenpox infection. She has enrolled in CNA classes, which are scheduled to start in 08/2025, and she is required to submit her immunization records for the same. She has brought all her available immunization records to this visit. She has completed her medication course for latent tuberculosis.        Objective:     Exam:  /70 (BP Location: Left arm, Patient Position: Sitting, BP Cuff Size: Adult)   Pulse 85   Temp 37 °C (98.6 °F) (Temporal)   Ht 1.753 m (5' 9\")   Wt 66.7 kg (147 lb)   SpO2 100%   BMI 21.71 kg/m²  Body mass index is 21.71 kg/m².    Physical Exam  Constitutional:       Appearance: Normal appearance.   HENT:      Head: Normocephalic and atraumatic.      Nose: Nose normal.      Mouth/Throat:      Mouth: Mucous membranes are moist.   Eyes:      Extraocular Movements: Extraocular movements intact.      Conjunctiva/sclera: Conjunctivae normal.      Pupils: Pupils are equal, round, and reactive to light.   Cardiovascular:      Rate and Rhythm: Normal rate and regular rhythm.      Heart sounds: No murmur heard.  Pulmonary:      Effort: Pulmonary effort is normal.      Breath sounds: Normal breath sounds.   Abdominal:      General: Abdomen is flat. Bowel " sounds are normal.   Musculoskeletal:      Cervical back: Neck supple.   Skin:     General: Skin is warm and dry.   Neurological:      General: No focal deficit present.      Mental Status: She is alert and oriented to person, place, and time.   Psychiatric:         Mood and Affect: Mood normal.             Results      Assessment & Plan:     1. Screening examination for STD (sexually transmitted disease)  VARICELLA ZOSTER IGG AB    HIV AG/AB Combo Assay Screening    T.Pallidum AB NANAMARIE (Screening)    Hepatitis C Virus Antibody    HEP B Surface Antibody    Hep B Core AB Total    Hep B Surface Antigen      2. Immunity status testing  VARICELLA ZOSTER IGG AB    HEP B Surface Antibody    RUBEOLA IGG AB    MUMPS AB IGG    RUBELLA ABS IGG      3. TB lung, latent        4. Encounter for completion of form with patient            Assessment & Plan  Immunization records/Forms  - Immunization status is current, with no additional vaccines due at this time  - Titers will be ordered to assess immunity levels as well  - A comprehensive packet containing all necessary documents will be prepared for her convenience  - She can schedule a nurse visit for any required vaccinations if her titers are not positive    TB, latent  - Patient has completed treatment and had a normal CXR in the last 6 months  - Packet will be completed    HCM  - Will order lab work            I spent a total of 40 minutes with record review, exam, communication with the patient, communication with other providers, and documentation of this encounter.    Return if symptoms worsen or fail to improve.    Please note that this dictation was created using voice recognition software. I have made every reasonable attempt to correct obvious errors, but I expect that there are errors of grammar and possibly content that I did not discover before finalizing the note.      I personally reviewed and updated and reviewed the patient's personal, social, family and surgical  history at today's appointment.

## 2025-06-24 ENCOUNTER — PATIENT MESSAGE (OUTPATIENT)
Dept: MEDICAL GROUP | Facility: PHYSICIAN GROUP | Age: 35
End: 2025-06-24
Payer: COMMERCIAL

## 2025-06-24 DIAGNOSIS — N91.1 SECONDARY AMENORRHEA: Primary | ICD-10-CM

## 2025-06-24 DIAGNOSIS — Z3A.01 LESS THAN 8 WEEKS GESTATION OF PREGNANCY: ICD-10-CM

## 2025-06-27 ENCOUNTER — RESULTS FOLLOW-UP (OUTPATIENT)
Dept: MEDICAL GROUP | Facility: PHYSICIAN GROUP | Age: 35
End: 2025-06-27

## 2025-06-27 ENCOUNTER — HOSPITAL ENCOUNTER (OUTPATIENT)
Dept: LAB | Facility: MEDICAL CENTER | Age: 35
End: 2025-06-27
Attending: FAMILY MEDICINE
Payer: COMMERCIAL

## 2025-06-27 DIAGNOSIS — N91.1 SECONDARY AMENORRHEA: ICD-10-CM

## 2025-06-27 LAB — B-HCG SERPL-ACNC: 4191 MIU/ML (ref 0–5)

## 2025-06-27 PROCEDURE — 84702 CHORIONIC GONADOTROPIN TEST: CPT

## 2025-06-27 PROCEDURE — 36415 COLL VENOUS BLD VENIPUNCTURE: CPT

## 2025-06-30 RX ORDER — PNV NO.95/FERROUS FUM/FOLIC AC 28MG-0.8MG
1 TABLET ORAL DAILY
Qty: 90 TABLET | Refills: 3 | Status: SHIPPED | OUTPATIENT
Start: 2025-06-30

## 2025-07-08 NOTE — Clinical Note
REFERRAL APPROVAL NOTICE         Sent on July 8, 2025                   Luke Moon  [address]  [place] [address]                   Dear Ms. Moon,    After a careful review of the medical information and benefit coverage, [facility] has processed your referral. See below for additional details.    If applicable, you must be actively enrolled with your insurance for coverage of the authorized service. If you have any questions regarding your coverage, please contact your insurance directly.    REFERRAL INFORMATION   Referral #:  [id number]  Referred-To Provider    Referred-By Provider:  Obstetrics & Gynecology    Shayna Dent M.D.   [facility]      [address]  [place] [address]  [phone] [id number]  [place] [address]  [phone]    Referral Start Date:  06/30/2025  Referral End Date:   06/30/2026             SCHEDULING  If you do not already have an appointment, please call [phone] to make an appointment.     MORE INFORMATION  If you do not already have a [employer] account, sign up at: [email or web address]  You can access your medical information, make appointments, see lab results, billing information, and more.  If you have questions regarding this referral, please contact  the [facility] Referrals department at:             [phone]. Monday - Friday 8:00AM - 5:00PM.     Sincerely,    [facility]

## 2025-07-23 ENCOUNTER — APPOINTMENT (OUTPATIENT)
Dept: LAB | Facility: MEDICAL CENTER | Age: 35
End: 2025-07-23
Attending: OBSTETRICS & GYNECOLOGY
Payer: COMMERCIAL